# Patient Record
Sex: FEMALE | Race: WHITE | URBAN - METROPOLITAN AREA
[De-identification: names, ages, dates, MRNs, and addresses within clinical notes are randomized per-mention and may not be internally consistent; named-entity substitution may affect disease eponyms.]

---

## 2017-03-13 ENCOUNTER — TELEPHONE (OUTPATIENT)
Dept: FAMILY MEDICINE | Facility: CLINIC | Age: 22
End: 2017-03-13

## 2017-03-13 ENCOUNTER — OFFICE VISIT (OUTPATIENT)
Dept: FAMILY MEDICINE | Facility: CLINIC | Age: 22
End: 2017-03-13
Payer: COMMERCIAL

## 2017-03-13 VITALS
BODY MASS INDEX: 25.71 KG/M2 | TEMPERATURE: 99.9 F | HEART RATE: 94 BPM | SYSTOLIC BLOOD PRESSURE: 116 MMHG | WEIGHT: 160 LBS | OXYGEN SATURATION: 93 % | RESPIRATION RATE: 14 BRPM | DIASTOLIC BLOOD PRESSURE: 68 MMHG | HEIGHT: 66 IN

## 2017-03-13 DIAGNOSIS — R05.9 COUGH: ICD-10-CM

## 2017-03-13 DIAGNOSIS — R68.89 FLU-LIKE SYMPTOMS: Primary | ICD-10-CM

## 2017-03-13 DIAGNOSIS — R07.0 THROAT PAIN: ICD-10-CM

## 2017-03-13 LAB
DEPRECATED S PYO AG THROAT QL EIA: NORMAL
FLUAV+FLUBV AG SPEC QL: ABNORMAL
FLUAV+FLUBV AG SPEC QL: NEGATIVE
MICRO REPORT STATUS: NORMAL
SPECIMEN SOURCE: ABNORMAL
SPECIMEN SOURCE: NORMAL

## 2017-03-13 PROCEDURE — 87081 CULTURE SCREEN ONLY: CPT | Performed by: FAMILY MEDICINE

## 2017-03-13 PROCEDURE — 87880 STREP A ASSAY W/OPTIC: CPT | Performed by: FAMILY MEDICINE

## 2017-03-13 PROCEDURE — 87804 INFLUENZA ASSAY W/OPTIC: CPT | Performed by: FAMILY MEDICINE

## 2017-03-13 PROCEDURE — 99214 OFFICE O/P EST MOD 30 MIN: CPT | Performed by: FAMILY MEDICINE

## 2017-03-13 RX ORDER — PREDNISONE 20 MG/1
20 TABLET ORAL DAILY
Qty: 5 TABLET | Refills: 0 | Status: SHIPPED | OUTPATIENT
Start: 2017-03-13 | End: 2018-01-31

## 2017-03-13 RX ORDER — CODEINE PHOSPHATE AND GUAIFENESIN 10; 100 MG/5ML; MG/5ML
1-2 SOLUTION ORAL
Qty: 120 ML | Refills: 0 | Status: SHIPPED | OUTPATIENT
Start: 2017-03-13 | End: 2018-01-31

## 2017-03-13 RX ORDER — AZITHROMYCIN 250 MG/1
TABLET, FILM COATED ORAL
Qty: 6 TABLET | Refills: 0 | Status: SHIPPED | OUTPATIENT
Start: 2017-03-13 | End: 2018-01-31

## 2017-03-13 NOTE — PROGRESS NOTES
"  SUBJECTIVE:                                                    Machelle Gutierrez is a 21 year old female who presents to clinic today for the following health issues:      RESPIRATORY SYMPTOMS      Duration: 3 days According to patient  Became sick very suddenly with chills, bodyaches, cough, ear aahe, fatigue . She reports moderate sore throat. She is nonasthmatic, non smoker and did vaccinate against the flu in October 2016    Description  nasal congestion, rhinorrhea, sore throat, facial pain/pressure, cough, wheezing, fever, chills, ear pain both, headache, fatigue/malaise, hoarse voice and myalgias    Severity: severe    Accompanying signs and symptoms: Shortness of breath-especially when talking    History (predisposing factors):  None-boss has pneumonia    Precipitating or alleviating factors: None    Therapies tried and outcome:  rest and fluids, Nyquil, OTC NSAID slight help       :  Chief Complaint   Patient presents with     URI         Current Outpatient Prescriptions   Medication     predniSONE (DELTASONE) 20 MG tablet     cetirizine (ZYRTEC) 10 MG tablet     DiphenhydrAMINE HCl (BENADRYL ALLERGY CHILDRENS) 12.5 MG CHEW     norgestimate-ethinyl estradiol (SPRINTEC 28) 0.25-35 MG-MCG per tablet     No current facility-administered medications for this visit.        Past medical and family history,medications, allergies and problem list reviewed       ROS: 10 point ROS neg other than the symptoms noted above in the HPI.    EXAM:/68 (BP Location: Left arm, Patient Position: Chair, Cuff Size: Adult Regular)  Pulse 94  Temp 99.9  F (37.7  C) (Oral)  Resp 14  Ht 5' 6\" (1.676 m)  Wt 160 lb (72.6 kg)  SpO2 93%  BMI 25.82 kg/m2  Constitutional: healthy, alert and no distress   Head: Normocephalic. No masses, lesions, tenderness or abnormalities  Eyes; PERRLA EOMI  Neck: Neck supple. No adenopathy. Thyroid symmetric, normal size  Ear,Nose, clear . Throat: normal, oropharynx  Cardiovascular: negative,  " RRR. No murmurs, clicks gallops or rub  Respiratory:  scattered wheezing, without crackles or respiratory distress.  Abdomen: Abdomen soft, non-tender.no organomegaly, No CVA tenderness.  NEURO: Gait normal. Reflexes normal and symmetric. Sensation grossly WNL.  Psychiatric: mentation appears normal and affect normal/bright    ASSESSMENT / PLAN:  Influenza B positive   Rest, fluids of choice, healthy hydration  Due to Wheezing and  Cough   predniSONE (DELTASONE) 20 MG tablet once daily for 5 days   Cough syrup as needed   If cough productive of sputum - ok to start antibiotic zithromax , no need to start now, as viral and too late to benefit from  antiviral  Sore throat , possibly from nasal congestion from flu. Rapid strept was negative         Potential medication side effects were discussed with the patient; let me know if any occur.    The patient indicates understanding of these issues and agrees with the plan.      Orin Martin

## 2017-03-13 NOTE — TELEPHONE ENCOUNTER
Pt requesting apt.  Since Friday has had fevers, body aches, sore throat.  Also has lost her voice and has sob when talking.  Boss had pneumonia.  Scheduled apt and advised pt to wear a mask when she comes in.  Umu Wright RN

## 2017-03-13 NOTE — MR AVS SNAPSHOT
"              After Visit Summary   3/13/2017    Machelle Gutierrez    MRN: [id number]           Patient Information     Date Of Birth          1995        Visit Information        Provider Department      3/13/2017 10:15 AM Orin Martin MD [facility]        Today's Diagnoses     Flu-like symptoms    -  1    Throat pain        Cough          Care Instructions    Ibuprofen with meals up to three times daily , 600 mg tab  prednisone 20 mg once daily for 5 days       Flu-like symptoms  (primary encounter diagnosis)  Plan: Influenza A/B antigen,   Fluids, rest follow up as needed     (R07.0) Throat pain  Plan: Rapid strep screen- negative  symptomatic treatment as needed         (R05) Cough  Plan: predniSONE (DELTASONE) 20 MG tablet  Cough syrup as needed   If cough productive of sputum - ok to start antibiotic   2Tabs day 1 &  Than 1TAB  once daily for 4 more day                Follow-ups after your visit        Who to contact     If you have questions or need follow up information about today's clinic visit or your schedule please contact [facility] directly at [phone].  Normal or non-critical lab and imaging results will be communicated to you by [employer], letter or phone within 4 business days after the clinic has received the results. If you do not hear from us within 7 days, please contact the clinic through [employer] or phone. If you have a critical or abnormal lab result, we will notify you by phone as soon as possible.  Submit refill requests through [employer] or call your pharmacy and they will forward the refill request to us. Please allow 3 business days for your refill to be completed.          Additional Information About Your Visit        MyChart Information     [employer]u lets you send messages to your doctor, view your test results, renew your prescriptions, schedule appointments and more. To sign up, go to [email or web address] . Click on \"Log in\" on the left side " "of the screen, which will take you to the Welcome page. Then click on \"Sign up Now\" on the right side of the page.     You will be asked to enter the access code listed below, as well as some personal information. Please follow the directions to create your username and password.     Your access code is: [email or web address]  Expires: 2017 11:30 AM     Your access code will  in 90 days. If you need help or a new code, please call your [place] clinic or [phone].        Care EveryWhere ID     This is your Care EveryWhere ID. This could be used by other organizations to access your [place] medical records  [id number]        Your Vitals Were     Pulse Temperature Respirations Height Pulse Oximetry BMI (Body Mass Index)    94 99.9  F (37.7  C) (Oral) 14 5' 6\" (1.676 m) 93% 25.82 kg/m2       Blood Pressure from Last 3 Encounters:   17 116/68   16 106/68   16 114/68    Weight from Last 3 Encounters:   17 160 lb (72.6 kg)   16 171 lb 4.8 oz (77.7 kg)   16 169 lb (76.7 kg)              We Performed the Following     Beta strep group A culture     Influenza A/B antigen     Rapid strep screen          Today's Medication Changes          These changes are accurate as of: 3/13/17 11:30 AM.  If you have any questions, ask your nurse or doctor.               Start taking these medicines.        Dose/Directions    azithromycin 250 MG tablet   Commonly known as:  ZITHROMAX   Used for:  Cough   Started by:  Orin Martin MD        2Tabs day 1 &  Than 1TAB  once daily for 4 more day   Quantity:  6 tablet   Refills:  0       guaiFENesin-codeine 100-10 MG/5ML Soln solution   Commonly known as:  ROBITUSSIN AC   Used for:  Cough   Started by:  Orin Martin MD        Dose:  1-2 tsp.   Take 5-10 mLs by mouth nightly as needed for cough   Quantity:  120 mL   Refills:  0       predniSONE 20 MG tablet   Commonly known as:  DELTASONE   Used for:  Cough   Started by:  Orin Martin" MD [place]        Dose:  20 mg   Take 1 tablet (20 mg) by mouth daily   Quantity:  5 tablet   Refills:  0            Where to get your medicines      These medications were sent to [id number] - [place], [place] - [id number] [facility] [address]     Phone:  [phone]     azithromycin 250 MG tablet    predniSONE 20 MG tablet         Some of these will need a paper prescription and others can be bought over the counter.  Ask your nurse if you have questions.     Bring a paper prescription for each of these medications     guaiFENesin-codeine 100-10 MG/5ML Soln solution                Primary Care Provider    None Specified       No primary provider on file.        Thank you!     Thank you for choosing [facility]  for your care. Our goal is always to provide you with excellent care. Hearing back from our patients is one way we can continue to improve our services. Please take a few minutes to complete the written survey that you may receive in the mail after your visit with us. Thank you!             Your Updated Medication List - Protect others around you: Learn how to safely use, store and throw away your medicines at www.disposemymeds.org.          This list is accurate as of: 3/13/17 11:30 AM.  Always use your most recent med list.                   Brand Name Dispense Instructions for use    azithromycin 250 MG tablet    ZITHROMAX    6 tablet    2Tabs day 1 &  Than 1TAB  once daily for 4 more day       BENADRYL ALLERGY CHILDRENS 12.5 MG Chew   Generic drug:  DiphenhydrAMINE HCl      12.5 mg       cetirizine 10 MG tablet    zyrTEC     Take 10 mg by mouth       guaiFENesin-codeine 100-10 MG/5ML Soln solution    ROBITUSSIN AC    120 mL    Take 5-10 mLs by mouth nightly as needed for cough       norgestimate-ethinyl estradiol 0.25-35 MG-MCG per tablet    SPRINTEC 28    84 tablet    Take 1 tablet by mouth daily       predniSONE 20 MG tablet    DELTASONE    5 tablet     Take 1 tablet (20 mg) by mouth daily

## 2017-03-13 NOTE — TELEPHONE ENCOUNTER
Reason for call:  Patient reporting a symptom    Symptom or request: The patient has had Fever, body aches, cough and chills  Temp. 101  Duration (how long have symptoms been present): Friday   3/10    Have you been treated for this before? No    Additional comments: The patient wants to be seen today    Phone Number patient can be reached at:  Home number on file [phone] (home)    Best Time:  ASAP    Can we leave a detailed message on this number:  YES    Call taken on 3/13/2017 at 8:37 AM by Zuleika Can

## 2017-03-13 NOTE — PATIENT INSTRUCTIONS
Ibuprofen with meals up to three times daily , 600 mg tab  prednisone 20 mg once daily for 5 days       Flu-like symptoms  (primary encounter diagnosis)  Plan: Influenza A/B antigen,   Fluids, rest follow up as needed     (R07.0) Throat pain  Plan: Rapid strep screen- negative  symptomatic treatment as needed         (R05) Cough  Plan: predniSONE (DELTASONE) 20 MG tablet  Cough syrup as needed   If cough productive of sputum - ok to start antibiotic   2Tabs day 1 &  Than 1TAB  once daily for 4 more day

## 2017-03-15 LAB
BACTERIA SPEC CULT: NORMAL
MICRO REPORT STATUS: NORMAL
SPECIMEN SOURCE: NORMAL

## 2017-12-27 DIAGNOSIS — L70.0 ACNE VULGARIS: ICD-10-CM

## 2017-12-28 RX ORDER — NORGESTIMATE AND ETHINYL ESTRADIOL 0.25-0.035
KIT ORAL
Qty: 28 TABLET | Refills: 0 | Status: SHIPPED | OUTPATIENT
Start: 2017-12-28 | End: 2018-01-31

## 2017-12-28 NOTE — TELEPHONE ENCOUNTER
Medication is being filled for 1 time refill only due to:  Patient needs to be seen because it has been more than one year since last visit.   Due for physical.  Last 12/2/16.  Francesca Marquez RN      Requested Prescriptions   Pending Prescriptions Disp Refills     SPRINTEC 28 0.25-35 MG-MCG per tablet [Pharmacy Med Name: Sprintec 28 Oral Tablet 0.25-35 MG-MCG] 84 tablet 2     Sig: take 1 tablet by mouth daily    Contraceptives Protocol Passed    12/27/2017  7:00 AM       Passed - Patient is not a current smoker if age is 35 or older       Passed - Recent or future visit with authorizing provider's specialty    Patient had office visit in the last year or has a visit in the next 30 days with authorizing provider.  See chart review.              Passed - No active pregnancy on record       Passed - No positive pregnancy test in past 12 months

## 2018-01-31 ENCOUNTER — OFFICE VISIT (OUTPATIENT)
Dept: FAMILY MEDICINE | Facility: CLINIC | Age: 23
End: 2018-01-31
Payer: COMMERCIAL

## 2018-01-31 VITALS
RESPIRATION RATE: 16 BRPM | OXYGEN SATURATION: 97 % | HEART RATE: 78 BPM | SYSTOLIC BLOOD PRESSURE: 132 MMHG | DIASTOLIC BLOOD PRESSURE: 83 MMHG | HEIGHT: 66 IN | WEIGHT: 157 LBS | TEMPERATURE: 97.9 F | BODY MASS INDEX: 25.23 KG/M2

## 2018-01-31 DIAGNOSIS — L70.0 ACNE VULGARIS: ICD-10-CM

## 2018-01-31 DIAGNOSIS — Z00.00 ENCOUNTER FOR ROUTINE ADULT HEALTH EXAMINATION WITHOUT ABNORMAL FINDINGS: Primary | ICD-10-CM

## 2018-01-31 PROCEDURE — 99395 PREV VISIT EST AGE 18-39: CPT | Performed by: FAMILY MEDICINE

## 2018-01-31 RX ORDER — NORGESTIMATE AND ETHINYL ESTRADIOL 0.25-0.035
1 KIT ORAL DAILY
Qty: 84 TABLET | Refills: 3 | Status: SHIPPED | OUTPATIENT
Start: 2018-01-31 | End: 2019-01-01

## 2018-01-31 NOTE — MR AVS SNAPSHOT
After Visit Summary   1/31/2018    Machelle Gutierrez    MRN: [id number]           Patient Information     Date Of Birth          1995        Visit Information        Provider Department      1/31/2018 7:15 AM Kyra Wright DO [facility]        Today's Diagnoses     Encounter for routine adult health examination without abnormal findings    -  1    Acne vulgaris          Care Instructions      Preventive Health Recommendations  Female Ages 18 to 25     Yearly exam:     See your health care provider every year in order to  o Review health changes.   o Discuss preventive care.    o Review your medicines if your doctor has prescribed any.      You should be tested each year for STDs (sexually transmitted diseases).       After age 20, talk to your provider about how often you should have cholesterol testing.      Starting at age 21, get a Pap test every three years. If you have an abnormal result, your doctor may have you test more often.      If you are at risk for diabetes, you should have a diabetes test (fasting glucose).     Shots:     Get a flu shot each year.     Get a tetanus shot every 10 years.     Consider getting the shot (vaccine) that prevents cervical cancer (Gardasil).    Nutrition:     Eat at least 5 servings of fruits and vegetables each day.    Eat whole-grain bread, whole-wheat pasta and brown rice instead of white grains and rice.    Talk to your provider about Calcium and Vitamin D.     Lifestyle    Exercise at least 150 minutes a week each week (30 minutes a day, 5 days a week). This will help you control your weight and prevent disease.    Limit alcohol to one drink per day.    No smoking.     Wear sunscreen to prevent skin cancer.    See your dentist every six months for an exam and cleaning.          Follow-ups after your visit        Who to contact     If you have questions or need follow up information about today's clinic visit or your schedule please  "contact [facility] directly at [phone].  Normal or non-critical lab and imaging results will be communicated to you by MyChart, letter or phone within 4 business days after the clinic has received the results. If you do not hear from us within 7 days, please contact the clinic through MyChart or phone. If you have a critical or abnormal lab result, we will notify you by phone as soon as possible.  Submit refill requests through [employer] or call your pharmacy and they will forward the refill request to us. Please allow 3 business days for your refill to be completed.          Additional Information About Your Visit        bigtincanharTHUBIT Information     [employer] lets you send messages to your doctor, view your test results, renew your prescriptions, schedule appointments and more. To sign up, go to [email or web address] . Click on \"Log in\" on the left side of the screen, which will take you to the Welcome page. Then click on \"Sign up Now\" on the right side of the page.     You will be asked to enter the access code listed below, as well as some personal information. Please follow the directions to create your username and password.     Your access code is: [email or web address]  Expires: 2018  8:17 AM     Your access code will  in 90 days. If you need help or a new code, please call your [place] clinic or [phone].        Care EveryWhere ID     This is your Care EveryWhere ID. This could be used by other organizations to access your [place] medical records  [id number]        Your Vitals Were     Pulse Temperature Respirations Height Pulse Oximetry BMI (Body Mass Index)    78 97.9  F (36.6  C) (Oral) 16 5' 6\" (1.676 m) 97% 25.34 kg/m2       Blood Pressure from Last 3 Encounters:   18 132/83   17 116/68   16 106/68    Weight from Last 3 Encounters:   18 157 lb (71.2 kg)   17 160 lb (72.6 kg)   16 171 lb 4.8 oz (77.7 kg)              Today, you had the following     " No orders found for display         Today's Medication Changes          These changes are accurate as of 1/31/18  8:17 AM.  If you have any questions, ask your nurse or doctor.               These medicines have changed or have updated prescriptions.        Dose/Directions    norgestimate-ethinyl estradiol 0.25-35 MG-MCG per tablet   Commonly known as:  SPRINTEC 28   This may have changed:  See the new instructions.   Used for:  Acne vulgaris   Changed by:  Kyra Wright DO        Dose:  1 tablet   Take 1 tablet by mouth daily   Quantity:  84 tablet   Refills:  3            Where to get your medicines      These medications were sent to [facility][id number] - [place], [place] - [facility] [address]     Phone:  [phone]     norgestimate-ethinyl estradiol 0.25-35 MG-MCG per tablet                Primary Care Provider Office Phone # Fax #    Kyra Wright [phone] [address] [address]        Equal Access to Services     [facility]: Hadii aad ku hadasho Soomaali, waaxda luqadaha, qaybta kaalmada adeegyada, waxay idiin haylyubov collado . So [facility] [phone].    ATENCIÓN: Si habla español, tiene a foote disposición servicios gratuitos de asistencia lingüística. [facility] [phone].    We comply with applicable federal civil rights laws and [place] laws. We do not discriminate on the basis of race, color, national origin, age, disability, sex, sexual orientation, or gender identity.            Thank you!     Thank you for choosing [facility]  for your care. Our goal is always to provide you with excellent care. Hearing back from our patients is one way we can continue to improve our services. Please take a few minutes to complete the written survey that you may receive in the mail after your visit with us. Thank you!             Your Updated Medication List - Protect others around you: Learn how to  safely use, store and throw away your medicines at www.disposemymeds.org.          This list is accurate as of 1/31/18  8:17 AM.  Always use your most recent med list.                   Brand Name Dispense Instructions for use Diagnosis    BENADRYL ALLERGY CHILDRENS 12.5 MG Chew   Generic drug:  DiphenhydrAMINE HCl      12.5 mg        cetirizine 10 MG tablet    zyrTEC     Take 10 mg by mouth        norgestimate-ethinyl estradiol 0.25-35 MG-MCG per tablet    SPRINTEC 28    84 tablet    Take 1 tablet by mouth daily    Acne vulgaris

## 2018-01-31 NOTE — NURSING NOTE
"Chief Complaint   Patient presents with     Physical     /83  Pulse 78  Temp 97.9  F (36.6  C) (Oral)  Resp 16  Ht 5' 6\" (1.676 m)  Wt 157 lb (71.2 kg)  SpO2 97%  BMI 25.34 kg/m2 Estimated body mass index is 25.34 kg/(m^2) as calculated from the following:    Height as of this encounter: 5' 6\" (1.676 m).    Weight as of this encounter: 157 lb (71.2 kg).  bp completed using cuff size: regular       Health Maintenance addressed:  NONE    n/a    Hien Slaughter MA     "

## 2018-01-31 NOTE — PROGRESS NOTES
SUBJECTIVE:   CC: Machelle Gutierrez is an 22 year old woman who presents for preventive health visit.     Healthy Habits:    Do you get at least three servings of calcium containing foods daily (dairy, green leafy vegetables, etc.)? yes    Amount of exercise or daily activities, outside of work: 5-6 day(s) per week    Problems taking medications regularly No    Medication side effects: No    Have you had an eye exam in the past two years? yes    Do you see a dentist twice per year? yes    Do you have sleep apnea, excessive snoring or daytime drowsiness?no          Today's PHQ-2 Score:   PHQ-2 ( 1999 Pfizer) 1/31/2018 12/2/2016   Q1: Little interest or pleasure in doing things 0 0   Q2: Feeling down, depressed or hopeless 0 0   PHQ-2 Score 0 0       Abuse: Current or Past(Physical, Sexual or Emotional)- No  Do you feel safe in your environment - Yes    Social History   Substance Use Topics     Smoking status: Never Smoker     Smokeless tobacco: Never Used     Alcohol use 0.0 oz/week     0 Standard drinks or equivalent per week      Comment: 1x week     If you drink alcohol do you typically have >3 drinks per day or >7 drinks per week? No                     Reviewed orders with patient.  Reviewed health maintenance and updated orders accordingly - Yes  Patient Active Problem List   Diagnosis     Seasonal allergic rhinitis     Past Surgical History:   Procedure Laterality Date     SURGICAL HISTORY OF -   4/2016    wisdom teeth extraction       Social History   Substance Use Topics     Smoking status: Never Smoker     Smokeless tobacco: Never Used     Alcohol use 0.0 oz/week     0 Standard drinks or equivalent per week      Comment: 1x week     Family History   Problem Relation Age of Onset     Family History Negative Mother      Hypertension Father      Coronary Artery Disease Maternal Grandfather      Colon Cancer Maternal Grandmother      Hypertension Brother            Mammogram not appropriate for this  "patient based on age.    Pertinent mammograms are reviewed under the imaging tab.  History of abnormal Pap smear: NO - age 21-29 PAP every 3 years recommended    Reviewed and updated as needed this visit by clinical staff  Tobacco  Allergies  Meds  Med Hx  Surg Hx  Fam Hx  Soc Hx        Reviewed and updated as needed this visit by Provider            ROS:  C: NEGATIVE for fever, chills, change in weight  I: NEGATIVE for worrisome rashes, moles or lesions  E: NEGATIVE for vision changes or irritation  ENT: NEGATIVE for ear, mouth and throat problems  R: NEGATIVE for significant cough or SOB  B: NEGATIVE for masses, tenderness or discharge  CV: NEGATIVE for chest pain, palpitations or peripheral edema  GI: NEGATIVE for nausea, abdominal pain, heartburn, or change in bowel habits  : NEGATIVE for unusual urinary or vaginal symptoms. Periods are regular.  M: NEGATIVE for significant arthralgias or myalgia  N: NEGATIVE for weakness, dizziness or paresthesias  P: NEGATIVE for changes in mood or affect    OBJECTIVE:   /83  Pulse 78  Temp 97.9  F (36.6  C) (Oral)  Resp 16  Ht 5' 6\" (1.676 m)  Wt 157 lb (71.2 kg)  SpO2 97%  BMI 25.34 kg/m2  EXAM:  GENERAL: healthy, alert and no distress  EYES: Eyes grossly normal to inspection, PERRL and conjunctivae and sclerae normal  HENT: ear canals and TM's normal, nose and mouth without ulcers or lesions  NECK: no adenopathy, no asymmetry, masses, or scars and thyroid normal to palpation  RESP: lungs clear to auscultation - no rales, rhonchi or wheezes  BREAST: normal without masses, tenderness or nipple discharge and no palpable axillary masses or adenopathy  CV: regular rate and rhythm, normal S1 S2, no S3 or S4, no murmur, click or rub, no peripheral edema and peripheral pulses strong  ABDOMEN: soft, nontender, no hepatosplenomegaly, no masses and bowel sounds normal  MS: no gross musculoskeletal defects noted, no edema  SKIN: no suspicious lesions or " "rashes  NEURO: Normal strength and tone, mentation intact and speech normal  PSYCH: mentation appears normal, affect normal/bright    ASSESSMENT/PLAN:   1. Encounter for routine adult health examination without abnormal findings  Routine screening  Pap due in 2019  Pt declined the chlamydia test    2. Acne vulgaris  refilled  - norgestimate-ethinyl estradiol (SPRINTEC 28) 0.25-35 MG-MCG per tablet; Take 1 tablet by mouth daily  Dispense: 84 tablet; Refill: 3    COUNSELING:   Reviewed preventive health counseling, as reflected in patient instructions    BP Screening:   Last 3 BP Readings:    BP Readings from Last 3 Encounters:   01/31/18 132/83   03/13/17 116/68   12/29/16 106/68       The following was recommended to the patient:  Re-screen BP within a year and recommended lifestyle modifications     reports that she has never smoked. She has never used smokeless tobacco.    Estimated body mass index is 25.34 kg/(m^2) as calculated from the following:    Height as of this encounter: 5' 6\" (1.676 m).    Weight as of this encounter: 157 lb (71.2 kg).   Weight management plan: Discussed healthy diet and exercise guidelines and patient will follow up in 12 months in clinic to re-evaluate.    Counseling Resources:  ATP IV Guidelines  Pooled Cohorts Equation Calculator  Breast Cancer Risk Calculator  FRAX Risk Assessment  ICSI Preventive Guidelines  Dietary Guidelines for Americans, 2010  USDA's MyPlate  ASA Prophylaxis  Lung CA Screening    Kyra Wright, DO  [facility]  "

## 2019-01-01 DIAGNOSIS — L70.0 ACNE VULGARIS: ICD-10-CM

## 2019-01-02 RX ORDER — NORGESTIMATE AND ETHINYL ESTRADIOL 0.25-0.035
KIT ORAL
Qty: 28 TABLET | Refills: 0 | Status: SHIPPED | OUTPATIENT
Start: 2019-01-02

## 2019-01-02 NOTE — TELEPHONE ENCOUNTER
"Medication is being filled for 1 time refill only due to:  Patient needs to be seen because due for physical this month.   Francesca Marquez RN    Requested Prescriptions   Signed Prescriptions Disp Refills     SPRINTEC 28 0.25-35 MG-MCG tablet 28 tablet 0     Sig: Take 1 tablet by mouth daily    Contraceptives Protocol Passed - 1/1/2019  7:00 AM       Passed - Patient is not a current smoker if age is 35 or older       Passed - Recent (12 mo) or future (30 days) visit within the authorizing provider's specialty    Patient had office visit in the last 12 months or has a visit in the next 30 days with authorizing provider or within the authorizing provider's specialty.  See \"Patient Info\" tab in inbasket, or \"Choose Columns\" in Meds & Orders section of the refill encounter.             Passed - No active pregnancy on record       Passed - No positive pregnancy test in past 12 months            "